# Patient Record
Sex: MALE | Race: WHITE | Employment: OTHER | URBAN - METROPOLITAN AREA
[De-identification: names, ages, dates, MRNs, and addresses within clinical notes are randomized per-mention and may not be internally consistent; named-entity substitution may affect disease eponyms.]

---

## 2018-09-27 PROCEDURE — 86200 CCP ANTIBODY: CPT | Performed by: INTERNAL MEDICINE

## 2018-09-27 PROCEDURE — 80074 ACUTE HEPATITIS PANEL: CPT | Performed by: INTERNAL MEDICINE

## 2018-09-27 PROCEDURE — 86480 TB TEST CELL IMMUN MEASURE: CPT | Performed by: INTERNAL MEDICINE

## 2018-10-04 PROCEDURE — 36415 COLL VENOUS BLD VENIPUNCTURE: CPT | Performed by: INTERNAL MEDICINE

## 2018-10-04 PROCEDURE — 86480 TB TEST CELL IMMUN MEASURE: CPT | Performed by: INTERNAL MEDICINE

## 2018-10-04 PROCEDURE — 80074 ACUTE HEPATITIS PANEL: CPT | Performed by: INTERNAL MEDICINE

## 2018-10-04 PROCEDURE — 86618 LYME DISEASE ANTIBODY: CPT | Performed by: INTERNAL MEDICINE

## 2018-10-04 PROCEDURE — 86141 C-REACTIVE PROTEIN HS: CPT | Performed by: INTERNAL MEDICINE

## 2018-12-12 PROCEDURE — 86141 C-REACTIVE PROTEIN HS: CPT | Performed by: INTERNAL MEDICINE

## 2019-07-26 PROCEDURE — 86480 TB TEST CELL IMMUN MEASURE: CPT | Performed by: INTERNAL MEDICINE

## 2019-08-23 PROCEDURE — 86141 C-REACTIVE PROTEIN HS: CPT | Performed by: INTERNAL MEDICINE

## 2019-10-15 PROBLEM — M06.09 SERONEGATIVE RHEUMATOID ARTHRITIS OF MULTIPLE SITES (HCC): Status: ACTIVE | Noted: 2019-10-15

## 2019-10-15 PROBLEM — D84.9 IMMUNOCOMPROMISED PATIENT (HCC): Status: ACTIVE | Noted: 2019-10-15

## 2019-11-20 PROBLEM — M06.09 RHEUMATOID ARTHRITIS, SERONEGATIVE, MULTIPLE SITES (HCC): Status: ACTIVE | Noted: 2019-10-15

## 2022-09-27 ENCOUNTER — LAB ENCOUNTER (OUTPATIENT)
Dept: LAB | Age: 72
End: 2022-09-27
Attending: SURGERY
Payer: MEDICARE

## 2022-09-27 DIAGNOSIS — Z01.818 PREOP TESTING: ICD-10-CM

## 2022-09-28 LAB — SARS-COV-2 RNA RESP QL NAA+PROBE: NOT DETECTED

## 2022-09-30 ENCOUNTER — HOSPITAL ENCOUNTER (OUTPATIENT)
Facility: HOSPITAL | Age: 72
Setting detail: HOSPITAL OUTPATIENT SURGERY
Discharge: HOME | End: 2022-09-30
Attending: SURGERY | Admitting: SURGERY
Payer: MEDICARE

## 2022-09-30 ENCOUNTER — ANESTHESIA EVENT (OUTPATIENT)
Dept: SURGERY | Facility: HOSPITAL | Age: 72
End: 2022-09-30
Payer: MEDICARE

## 2022-09-30 ENCOUNTER — ANESTHESIA (OUTPATIENT)
Dept: SURGERY | Facility: HOSPITAL | Age: 72
End: 2022-09-30
Payer: MEDICARE

## 2022-09-30 VITALS
HEIGHT: 71 IN | HEART RATE: 62 BPM | BODY MASS INDEX: 27.58 KG/M2 | SYSTOLIC BLOOD PRESSURE: 134 MMHG | TEMPERATURE: 98 F | WEIGHT: 197 LBS | OXYGEN SATURATION: 96 % | DIASTOLIC BLOOD PRESSURE: 69 MMHG | RESPIRATION RATE: 16 BRPM

## 2022-09-30 DIAGNOSIS — Z01.818 PREOP TESTING: Primary | ICD-10-CM

## 2022-09-30 PROCEDURE — 0YUA4JZ SUPPLEMENT BILATERAL INGUINAL REGION WITH SYNTHETIC SUBSTITUTE, PERCUTANEOUS ENDOSCOPIC APPROACH: ICD-10-PCS | Performed by: SURGERY

## 2022-09-30 PROCEDURE — 8E0W4CZ ROBOTIC ASSISTED PROCEDURE OF TRUNK REGION, PERCUTANEOUS ENDOSCOPIC APPROACH: ICD-10-PCS | Performed by: SURGERY

## 2022-09-30 DEVICE — PROGRIP MESH SURGICAL 16X12CM: Type: IMPLANTABLE DEVICE | Site: INGUINAL | Status: FUNCTIONAL

## 2022-09-30 RX ORDER — CEFAZOLIN SODIUM/WATER 2 G/20 ML
2 SYRINGE (ML) INTRAVENOUS ONCE
Status: COMPLETED | OUTPATIENT
Start: 2022-09-30 | End: 2022-09-30

## 2022-09-30 RX ORDER — MORPHINE SULFATE 4 MG/ML
4 INJECTION, SOLUTION INTRAMUSCULAR; INTRAVENOUS EVERY 10 MIN PRN
Status: DISCONTINUED | OUTPATIENT
Start: 2022-09-30 | End: 2022-09-30

## 2022-09-30 RX ORDER — SODIUM CHLORIDE, SODIUM LACTATE, POTASSIUM CHLORIDE, CALCIUM CHLORIDE 600; 310; 30; 20 MG/100ML; MG/100ML; MG/100ML; MG/100ML
INJECTION, SOLUTION INTRAVENOUS CONTINUOUS
Status: DISCONTINUED | OUTPATIENT
Start: 2022-09-30 | End: 2022-09-30

## 2022-09-30 RX ORDER — BUPIVACAINE HYDROCHLORIDE 5 MG/ML
INJECTION, SOLUTION EPIDURAL; INTRACAUDAL AS NEEDED
Status: DISCONTINUED | OUTPATIENT
Start: 2022-09-30 | End: 2022-09-30 | Stop reason: HOSPADM

## 2022-09-30 RX ORDER — METOCLOPRAMIDE HYDROCHLORIDE 5 MG/ML
10 INJECTION INTRAMUSCULAR; INTRAVENOUS EVERY 8 HOURS PRN
Status: DISCONTINUED | OUTPATIENT
Start: 2022-09-30 | End: 2022-09-30

## 2022-09-30 RX ORDER — HYDROMORPHONE HYDROCHLORIDE 1 MG/ML
0.4 INJECTION, SOLUTION INTRAMUSCULAR; INTRAVENOUS; SUBCUTANEOUS EVERY 5 MIN PRN
Status: DISCONTINUED | OUTPATIENT
Start: 2022-09-30 | End: 2022-09-30

## 2022-09-30 RX ORDER — SODIUM CHLORIDE 9 MG/ML
INJECTION, SOLUTION INTRAVENOUS CONTINUOUS PRN
Status: DISCONTINUED | OUTPATIENT
Start: 2022-09-30 | End: 2022-09-30 | Stop reason: SURG

## 2022-09-30 RX ORDER — ONDANSETRON 2 MG/ML
4 INJECTION INTRAMUSCULAR; INTRAVENOUS EVERY 6 HOURS PRN
Status: DISCONTINUED | OUTPATIENT
Start: 2022-09-30 | End: 2022-09-30

## 2022-09-30 RX ORDER — ACETAMINOPHEN 500 MG
1000 TABLET ORAL ONCE
Status: COMPLETED | OUTPATIENT
Start: 2022-09-30 | End: 2022-09-30

## 2022-09-30 RX ORDER — ROCURONIUM BROMIDE 10 MG/ML
INJECTION, SOLUTION INTRAVENOUS AS NEEDED
Status: DISCONTINUED | OUTPATIENT
Start: 2022-09-30 | End: 2022-09-30 | Stop reason: SURG

## 2022-09-30 RX ORDER — HYDROMORPHONE HYDROCHLORIDE 1 MG/ML
0.6 INJECTION, SOLUTION INTRAMUSCULAR; INTRAVENOUS; SUBCUTANEOUS EVERY 5 MIN PRN
Status: DISCONTINUED | OUTPATIENT
Start: 2022-09-30 | End: 2022-09-30

## 2022-09-30 RX ORDER — HYDROMORPHONE HYDROCHLORIDE 1 MG/ML
0.2 INJECTION, SOLUTION INTRAMUSCULAR; INTRAVENOUS; SUBCUTANEOUS EVERY 5 MIN PRN
Status: DISCONTINUED | OUTPATIENT
Start: 2022-09-30 | End: 2022-09-30

## 2022-09-30 RX ORDER — NEOSTIGMINE METHYLSULFATE 1 MG/ML
INJECTION, SOLUTION INTRAVENOUS AS NEEDED
Status: DISCONTINUED | OUTPATIENT
Start: 2022-09-30 | End: 2022-09-30 | Stop reason: SURG

## 2022-09-30 RX ORDER — MORPHINE SULFATE 4 MG/ML
2 INJECTION, SOLUTION INTRAMUSCULAR; INTRAVENOUS EVERY 10 MIN PRN
Status: DISCONTINUED | OUTPATIENT
Start: 2022-09-30 | End: 2022-09-30

## 2022-09-30 RX ORDER — DEXAMETHASONE SODIUM PHOSPHATE 4 MG/ML
VIAL (ML) INJECTION AS NEEDED
Status: DISCONTINUED | OUTPATIENT
Start: 2022-09-30 | End: 2022-09-30 | Stop reason: SURG

## 2022-09-30 RX ORDER — GLYCOPYRROLATE 0.2 MG/ML
INJECTION, SOLUTION INTRAMUSCULAR; INTRAVENOUS AS NEEDED
Status: DISCONTINUED | OUTPATIENT
Start: 2022-09-30 | End: 2022-09-30 | Stop reason: SURG

## 2022-09-30 RX ORDER — MIDAZOLAM HYDROCHLORIDE 1 MG/ML
INJECTION INTRAMUSCULAR; INTRAVENOUS AS NEEDED
Status: DISCONTINUED | OUTPATIENT
Start: 2022-09-30 | End: 2022-09-30 | Stop reason: SURG

## 2022-09-30 RX ORDER — HYDROCODONE BITARTRATE AND ACETAMINOPHEN 5; 325 MG/1; MG/1
1 TABLET ORAL EVERY 4 HOURS PRN
Qty: 15 TABLET | Refills: 0 | Status: SHIPPED | OUTPATIENT
Start: 2022-09-30 | End: 2022-10-10

## 2022-09-30 RX ORDER — NALOXONE HYDROCHLORIDE 0.4 MG/ML
80 INJECTION, SOLUTION INTRAMUSCULAR; INTRAVENOUS; SUBCUTANEOUS AS NEEDED
Status: DISCONTINUED | OUTPATIENT
Start: 2022-09-30 | End: 2022-09-30

## 2022-09-30 RX ORDER — ONDANSETRON 2 MG/ML
INJECTION INTRAMUSCULAR; INTRAVENOUS AS NEEDED
Status: DISCONTINUED | OUTPATIENT
Start: 2022-09-30 | End: 2022-09-30 | Stop reason: SURG

## 2022-09-30 RX ORDER — EPHEDRINE SULFATE 50 MG/ML
INJECTION, SOLUTION INTRAVENOUS AS NEEDED
Status: DISCONTINUED | OUTPATIENT
Start: 2022-09-30 | End: 2022-09-30 | Stop reason: SURG

## 2022-09-30 RX ORDER — MORPHINE SULFATE 10 MG/ML
6 INJECTION, SOLUTION INTRAMUSCULAR; INTRAVENOUS EVERY 10 MIN PRN
Status: DISCONTINUED | OUTPATIENT
Start: 2022-09-30 | End: 2022-09-30

## 2022-09-30 RX ORDER — LIDOCAINE HYDROCHLORIDE 10 MG/ML
INJECTION, SOLUTION EPIDURAL; INFILTRATION; INTRACAUDAL; PERINEURAL AS NEEDED
Status: DISCONTINUED | OUTPATIENT
Start: 2022-09-30 | End: 2022-09-30 | Stop reason: SURG

## 2022-09-30 RX ADMIN — EPHEDRINE SULFATE 5 MG: 50 INJECTION, SOLUTION INTRAVENOUS at 09:32:00

## 2022-09-30 RX ADMIN — SODIUM CHLORIDE: 9 INJECTION, SOLUTION INTRAVENOUS at 09:56:00

## 2022-09-30 RX ADMIN — ONDANSETRON 4 MG: 2 INJECTION INTRAMUSCULAR; INTRAVENOUS at 09:49:00

## 2022-09-30 RX ADMIN — EPHEDRINE SULFATE 5 MG: 50 INJECTION, SOLUTION INTRAVENOUS at 08:27:00

## 2022-09-30 RX ADMIN — NEOSTIGMINE METHYLSULFATE 4 MG: 1 INJECTION, SOLUTION INTRAVENOUS at 09:49:00

## 2022-09-30 RX ADMIN — ROCURONIUM BROMIDE 20 MG: 10 INJECTION, SOLUTION INTRAVENOUS at 08:36:00

## 2022-09-30 RX ADMIN — SODIUM CHLORIDE, SODIUM LACTATE, POTASSIUM CHLORIDE, CALCIUM CHLORIDE: 600; 310; 30; 20 INJECTION, SOLUTION INTRAVENOUS at 07:33:00

## 2022-09-30 RX ADMIN — ROCURONIUM BROMIDE 50 MG: 10 INJECTION, SOLUTION INTRAVENOUS at 07:39:00

## 2022-09-30 RX ADMIN — ROCURONIUM BROMIDE 20 MG: 10 INJECTION, SOLUTION INTRAVENOUS at 09:25:00

## 2022-09-30 RX ADMIN — SODIUM CHLORIDE: 9 INJECTION, SOLUTION INTRAVENOUS at 07:45:00

## 2022-09-30 RX ADMIN — LIDOCAINE HYDROCHLORIDE 50 MG: 10 INJECTION, SOLUTION EPIDURAL; INFILTRATION; INTRACAUDAL; PERINEURAL at 07:38:00

## 2022-09-30 RX ADMIN — DEXAMETHASONE SODIUM PHOSPHATE 4 MG: 4 MG/ML VIAL (ML) INJECTION at 08:03:00

## 2022-09-30 RX ADMIN — SODIUM CHLORIDE, SODIUM LACTATE, POTASSIUM CHLORIDE, CALCIUM CHLORIDE: 600; 310; 30; 20 INJECTION, SOLUTION INTRAVENOUS at 09:56:00

## 2022-09-30 RX ADMIN — CEFAZOLIN SODIUM/WATER 2 G: 2 G/20 ML SYRINGE (ML) INTRAVENOUS at 07:55:00

## 2022-09-30 RX ADMIN — MIDAZOLAM HYDROCHLORIDE 2 MG: 1 INJECTION INTRAMUSCULAR; INTRAVENOUS at 07:34:00

## 2022-09-30 RX ADMIN — GLYCOPYRROLATE 0.7 MG: 0.2 INJECTION, SOLUTION INTRAMUSCULAR; INTRAVENOUS at 09:49:00

## 2022-09-30 NOTE — OPERATIVE REPORT
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    Implants     Implant    Progrip Mesh Surgical [id number] - Sn/A - Implanted   (Right) Inguinal    Inventory item: PROGRIP MESH SURGICAL [id number] Model/Cat number: [id number]    Serial number: N/A : [employer] MIN[employer]IVE [employer] GRP    Lot number: [id number] Device identifier: [id number]    Device [facility] type: [facility]      As of 9/30/2022     Status: Implanted                  Progrip Mesh Surgical [id number] - Sn/A - Implanted   (Left) Inguinal    Inventory item: PROGRIP MESH SURGICAL [id number] Model/Cat number: [id number]    Serial number: N/A : [employer] MINIMALLY INVASIVE [employer]ER[employer] GRP    Lot number: [id number] Device identifier: [id number]    Device identifier type: [facility]      As of 9/30/2022     Status: Implanted                          Indications: This is a 25-year-old male who presents with a symptomatic left inguinal hernia. He was informed of the risks and benefits and wishes to proceed to the operating room for repair. Patient did void prior to coming to the operating room. Description of operation: Patient was brought to the operating room placed on table in a supine fashion. General anesthesia was administered by the anesthesia service without complication. He was positioned with both arms tucked at his side with extensive padding per protocol.  Was then prepped and draped in the usual sterile fashion. A surgical pause was completed and verified by all in the room. A supraumbilical transverse incision was made and the dissection was carried down to the fascia. The fascia was then grasped with Sylvia clamps and a Veress needle was carefully inserted into the abdominal cavity. This was done without complication. Saline drop test was negative. The abdominal cavity then insufflated with carbon dioxide gas. The 8 mm robotic bladeless trocar was then placed into the abdominal cavity without complication. The robot was brought in in position. It was then docked. The 30 degree camera was then placed through this trocar, the area beneath the Veress needle insertion site was inspected and negative for any injury. The remaining trochars were placed under direct vision. These included a right lateral 8 mm bladeless trocar and a left lateral 8 mm bladeless trocar, all within a horizontal line. The instruments were then advanced, these included a fenestrated bipolar on the left and a monopolar scissors on the right. I then went to the console. The pelvis was examined, he had bilateral inguinal hernias. Attention was first turned to the left side. The peritoneum was scored starting at the medial aspect and then working laterally dissecting the peritoneum down and creating a preperitoneal pocket. This dissection was continued until coopers ligament was identified and then it was continued laterally. The epigastric vessels were identified, cord was noted. This dissection was continued laterally to create a wide enough pocket. No obvious direct defect was noted. Attention was now turned to the cord. Vas deferens and testicular vessels were identified. Cord lipoma was noted within an indirect defect, this was dissected out of the defect and off of the cord structures without complication.   Hernia sac was noted adherent to the cord, this was carefully dissected off of the cord structures, dissected superiorly enough past the insertion of the vas deferens. No bleeding was noted. Attention was now turned to the right side. The peritoneum was scored starting at the medial aspect and then working laterally dissecting the peritoneum down and creating a preperitoneal pocket. This dissection was continued until coopers ligament was identified and then it was continued laterally. The epigastric vessels were identified, cord was noted. This dissection was continued laterally to create a wide enough pocket. No obvious direct defect was noted. Attention was now turned to the cord. Vas deferens and testicular vessels were identified. Cord lipoma was noted within an indirect defect, this was dissected out of the defect and off of the cord structures without complication. Hernia sac was noted adherent to the cord, this was carefully dissected off of the cord structures, dissected superiorly enough past the insertion of the vas deferens. Hernia defect was larger on the left versus the right. No bleeding was noted. The 16 x 12 progrip mesh x2 was then inserted into the abdominal cavity and positioned to provide coverage of the hernia defect and laterally, this was done bilaterally. The mesh provided excellent coverage. The mesh was tacked with 2-0 Vicryl suture into Coopers ligament and laterally away from any significant structures. The triangle of doom and triangle pain were identified not violated in any way to throughout the case. No bleeding was noted. The peritoneal flap was closed with 2-0 V-Loc running fashion. The peritoneum did lay flat against the mesh. Peritoneum was intact, no holes in the peritoneum. I then scrubbed back into the case. The supraumbilical trocar site was closed with 0-Vicryl on a fascial closure needle without complication. Trochars were removed and the abdominal cavity was desufflated.   Skin incisions were closed with 4-0 Monocryl suture followed by Dermabond. All lap counts, needle counts, and instrument counts noted be correct at the end of the case. The patient tolerated the procedure without any complication.

## 2022-09-30 NOTE — BRIEF OP NOTE
Pre-Operative Diagnosis: Left inguinal hernia     Post-Operative Diagnosis: Left inguinal hernia      Procedure Performed:   Robotic bilateral inguinal hernia repair with mesh     Surgeon(s) and Role:     * Daisy Sin MD - Primary    Assistant(s):  Surgical Assistant.: Jamee Li CSA     Surgical Findings: bilateral indirect defects, cord lipomas     Specimen: None     Estimated Blood Loss: 5 mL        Caleb Terry MD  9/30/2022  9:56 AM

## 2022-09-30 NOTE — H&P
HPI: This is a 57-year-old male who presents with a bulge in the left groin. He states the bulge does reduce when he pushes it back in. No trouble eating, no change in bowel habits. His wife is present throughout this visit. He is active without any chest pain or shortness of breath. No history of any inguinal surgeries or abdominal surgeries. He has had a cervical laminectomy and rotator cuff surgery along with knee replacements. Past Medical History:   Diagnosis Date   Rheumatoid arthritis ([facility] [address] 10/2018     Past Surgical History:   Procedure Laterality Date   KNEE REPLACEMENT SURGERY   OTHER SURGICAL HISTORY     Family History   Problem Relation Age of Onset   No Known Problems Father   No Known Problems Mother     Social History  Tobacco Use  Smoking status: Former  Types: Cigarettes  Smokeless tobacco: Never  Tobacco comments: quit smoking 30 yrs ago   Vaping Use  Vaping Use: Never used  Alcohol use: Yes  Drug use: Never    No Known Allergies    Social History: Lives at home,   Present in room: None    atorvastatin 20 MG Oral Tab, Take 20 mg by mouth daily. , Disp: , Rfl:   methotrexate 2.5 MG Oral Tab, Take 7 tablets (17.5 mg total) by mouth once a week., Disp: 72 tablet, Rfl: 0  Multiple Vitamin (ONE-DAILY MULTI VITAMINS) Oral Tab, Take 1 tablet by mouth daily. , Disp: , Rfl:   Cholecalciferol (VITAMIN D) 50 MCG (2000 UT) Oral Tab, Take by mouth., Disp: , Rfl:   Calcium Carbonate Antacid 400 MG Oral Chew Tab, Chew by mouth., Disp: , Rfl:   folic acid 1 MG Oral Tab, Take 1 tablet (1 mg total) by mouth daily. , Disp: 90 tablet, Rfl: 3  Certolizumab Pegol 2 X 200 MG/ML Subcutaneous Kit, Inject 400 mg into the skin every 30 (thirty) days. , Disp: , Rfl:     No current facility-administered medications on file prior to visit.     ROS:  GENERAL HEALTH: otherwise feels well, no weight loss, no fever or chills  SKIN: denies any unusual skin rashes or jaundice  HEENT: denies nasal congestion, sinus pain or sore throat; hearing loss negative  RESPIRATORY: denies shortness of breath, wheezing or cough   CARDIOVASCULAR: denies chest pain or RENNER; no palpitations   GI: denies nausea, vomiting, constipation, diarrhea; no rectal bleeding; no heartburn  GENITAL/: no dysuria, urgency or frequency, no tea colored urine  MUSCULOSKELETAL: no joint complaints upper or lower extremities  HEMATOLOGY: denies hx anemia; denies bruising or excessive bleeding    EXAM:  GENERAL: well developed, well nourished male, in no apparent distress, alert and oriented  SKIN: warm  EYES: Pupils equal and round, sclera anicteric  HEENT: normocephalic; normal nose  NECK: supple, normal ROM  RESPIRATORY: no wheezing, non-labored  CARDIOVASCULAR: regular rate, no chest tenderness  ABDOMEN: soft, nondistended, nontender. Reducible left inguinal hernia without scrotal involvement, no overlying skin changes, nontender. No obvious right-sided hernia. Examined supine and upright. LYMPHATIC: no obviouos lymphadenopathy  EXTREMITIES: no cyanosis or edema    Studies: None    Labs: None    Imp/Plan: Left inguinal hernia  I recommend a robotic left inguinal hernia repair with mesh, possible bilateral repair, poss open at the [facility] under general anesthesia. We reviewed the diagnosis and treatment options. We discussed a potential operative plan. We reviewed the risks, benefits, and alternatives. These include but are not limited to bleeding, infection, open operation, injury to other organs, hematoma formation, recurrence, urinary retention, Hamilton placement, repeat surgery, etc. We discussed postoperative pain control, we reviewed postoperative restrictions and expectations. They are aware that hernia surgery does hurt and cause discomfort. We discussed current attention with mesh recalls and mesh lawsuits.  We discussed the risks of mesh placement which include but are not limited to infection, surrounding tissue irritation/recall/fistula formation, etc. They are aware that this sometimes leads to repeat operations. We discussed the risk of acute pain and chronic pain. Patient states understanding of all of the above and does wish to proceed.

## 2022-09-30 NOTE — ANESTHESIA PROCEDURE NOTES
Airway  Date/Time: 9/30/2022 7:41 AM  Urgency: Elective    Airway not difficult    General Information and Staff    Patient location during procedure: OR  Anesthesiologist: Shae Mejia MD  Resident/CRNA: Tk Nova CRNA  Performed: CRNA     Indications and Patient Condition  Indications for airway management: anesthesia  Spontaneous ventilation: present  Sedation level: deep  Preoxygenated: yes  Patient position: sniffing  Mask difficulty assessment: 1 - vent by mask    Final Airway Details  Final airway type: endotracheal airway      Successful airway: ETT  Cuffed: yes   Successful intubation technique: direct laryngoscopy  Facilitating devices/methods: intubating stylet  Endotracheal tube insertion site: oral  Blade: Abiola  Blade size: #4  ETT size (mm): 7.5    Cormack-Lehane Classification: grade I - full view of glottis  Placement verified by: chest auscultation and capnometry   Measured from: lips  ETT to lips (cm): 22  Number of attempts at approach: 1

## 2022-09-30 NOTE — ANESTHESIA PROCEDURE NOTES
Peripheral IV  Date/Time: 9/30/2022 7:45 AM  Inserted by:  Jessica Hope CRNA    Placement  Needle size: 18 G  Laterality: right  Location: forearm  Local anesthetic: none  Site prep: alcohol  Technique: anatomical landmarks  Attempts: 1

## (undated) DEVICE — SUT VICRYL 2-0 SH J417H

## (undated) DEVICE — GAMMEX® NON-LATEX SIZE 8, STERILE NEOPRENE POWDER-FREE SURGICAL GLOVE: Brand: GAMMEX

## (undated) DEVICE — MONOPOLAR CURVED SCISSORS: Brand: ENDOWRIST

## (undated) DEVICE — COLUMN DRAPE

## (undated) DEVICE — SYSTEM SURGYPAD VELCRO 36IN

## (undated) DEVICE — ARM DRAPE

## (undated) DEVICE — ROBOTIC: Brand: MEDLINE INDUSTRIES, INC.

## (undated) DEVICE — CANNULA SEAL

## (undated) DEVICE — DRAPE SHEET LAPCHOLE 124X100X7

## (undated) DEVICE — SOLUTION  .9 1000ML BTL

## (undated) DEVICE — BLADELESS OBTURATOR: Brand: WECK VISTA

## (undated) DEVICE — INSUFFLATION NEEDLE TO ESTABLISH PNEUMOPERITONEUM.: Brand: INSUFFLATION NEEDLE

## (undated) DEVICE — SUT MONOCRYL 4-0 PS-2 Y426H

## (undated) DEVICE — CLOSURE EXOFIN 1.0ML

## (undated) DEVICE — LAPAROVUE VISIBILITY SYSTEM LAPAROSCOPIC SOLUTIONS: Brand: LAPAROVUE

## (undated) DEVICE — FENESTRATED BIPOLAR FORCEPS: Brand: ENDOWRIST

## (undated) DEVICE — TIP COVER ACCESSORY

## (undated) DEVICE — C-ARM: Brand: UNBRANDED

## (undated) DEVICE — GAMMEX® PI HYBRID SIZE 7.5, STERILE POWDER-FREE SURGICAL GLOVE, POLYISOPRENE AND NEOPRENE BLEND: Brand: GAMMEX

## (undated) DEVICE — SUT VICRYL 0 J608H

## (undated) DEVICE — MEGA SUTURECUT ND: Brand: ENDOWRIST

## (undated) DEVICE — SUTURE VLOC 90 2-0 9" 2145